# Patient Record
Sex: MALE | Race: WHITE | NOT HISPANIC OR LATINO | Employment: FULL TIME | URBAN - METROPOLITAN AREA
[De-identification: names, ages, dates, MRNs, and addresses within clinical notes are randomized per-mention and may not be internally consistent; named-entity substitution may affect disease eponyms.]

---

## 2020-03-07 ENCOUNTER — HOSPITAL ENCOUNTER (EMERGENCY)
Facility: HOSPITAL | Age: 25
Discharge: HOME | End: 2020-03-08
Attending: EMERGENCY MEDICINE | Admitting: EMERGENCY MEDICINE

## 2020-03-07 DIAGNOSIS — J02.9 ACUTE VIRAL PHARYNGITIS: Primary | ICD-10-CM

## 2020-03-07 PROCEDURE — 87880 STREP A ASSAY W/OPTIC: CPT | Performed by: EMERGENCY MEDICINE

## 2020-03-07 PROCEDURE — 87147 CULTURE TYPE IMMUNOLOGIC: CPT | Performed by: EMERGENCY MEDICINE

## 2020-03-07 PROCEDURE — 87081 CULTURE SCREEN ONLY: CPT | Performed by: EMERGENCY MEDICINE

## 2020-03-07 PROCEDURE — 99284 EMERGENCY DEPT VISIT MOD MDM: CPT

## 2020-03-08 VITALS
HEART RATE: 77 BPM | TEMPERATURE: 98.9 F | DIASTOLIC BLOOD PRESSURE: 65 MMHG | RESPIRATION RATE: 16 BRPM | HEIGHT: 70 IN | WEIGHT: 275 LBS | BODY MASS INDEX: 39.37 KG/M2 | OXYGEN SATURATION: 97 % | SYSTOLIC BLOOD PRESSURE: 119 MMHG

## 2020-03-08 LAB — S PYO AG THROAT QL: NEGATIVE

## 2020-03-08 PROCEDURE — 25010000002 DEXAMETHASONE PER 1 MG

## 2020-03-08 RX ORDER — DICLOFENAC SODIUM 75 MG/1
75 TABLET, DELAYED RELEASE ORAL 2 TIMES DAILY
Qty: 14 TABLET | Refills: 0 | Status: SHIPPED | OUTPATIENT
Start: 2020-03-08

## 2020-03-08 RX ORDER — DEXAMETHASONE SODIUM PHOSPHATE 10 MG/ML
10 INJECTION INTRAMUSCULAR; INTRAVENOUS ONCE
Status: COMPLETED | OUTPATIENT
Start: 2020-03-08 | End: 2020-03-08

## 2020-03-08 RX ADMIN — DEXAMETHASONE SODIUM PHOSPHATE 10 MG: 10 INJECTION INTRAMUSCULAR; INTRAVENOUS at 00:35

## 2020-03-08 NOTE — ED PROVIDER NOTES
Subjective   Mr. Chai Borrero is a 25 y.o male presenting to the emergency department with complaints of sore throat. His symptoms began yesterday and he complains of sore throat, fatigue, intermittent headache, nausea, and body aches primarily in his neck and shoulders. He had an episode of vomiting last night and confirms diarrhea today. He denies cough and fever. There are no other acute symptoms at this time.      History provided by:  Patient  Sore Throat   Location:  Generalized  Severity:  Moderate  Onset quality:  Sudden  Duration:  2 days  Timing:  Constant  Progression:  Worsening  Chronicity:  New  Relieved by:  None tried  Worsened by:  Nothing  Ineffective treatments:  None tried  Associated symptoms: headaches    Associated symptoms: no cough and no fever        Review of Systems   Constitutional: Positive for fatigue. Negative for fever.        Body aches   HENT: Positive for sore throat.    Respiratory: Negative for cough.    Gastrointestinal: Positive for diarrhea, nausea and vomiting.   Neurological: Positive for headaches.   All other systems reviewed and are negative.      History reviewed. No pertinent past medical history.    No Known Allergies    History reviewed. No pertinent surgical history.    History reviewed. No pertinent family history.    Social History     Socioeconomic History   • Marital status:      Spouse name: Not on file   • Number of children: Not on file   • Years of education: Not on file   • Highest education level: Not on file         Objective   Physical Exam   Constitutional: He is oriented to person, place, and time. He appears well-developed and well-nourished. No distress.   HENT:   Head: Normocephalic and atraumatic.   Mouth/Throat: No tonsillar exudate.   Bilateral tonsillar enlargement. No erythema. No exudates.   Eyes: Conjunctivae are normal. No scleral icterus.   Neck: Normal range of motion. Neck supple.   Anterior cervical lymph adenopathy    Cardiovascular: Normal rate, regular rhythm and normal heart sounds. Exam reveals no gallop and no friction rub.   No murmur heard.  Pulmonary/Chest: Effort normal and breath sounds normal. No respiratory distress. He has no wheezes.   Abdominal: Soft. There is no tenderness. There is no rebound and no guarding.   Musculoskeletal: He exhibits no deformity.   Lymphadenopathy:     He has cervical adenopathy.   Neurological: He is alert and oriented to person, place, and time.   Skin: Skin is warm and dry. He is not diaphoretic.   Psychiatric: He has a normal mood and affect. His behavior is normal.   Nursing note and vitals reviewed.      Procedures         ED Course          RSS negative.  Decadron given.  Patient stable on serial rechecks.  Discussed findings, concerns, plan of care, expected course, reasons to return and followup.  Provided the opportunity to ask questions.                                    MDM    Final diagnoses:   Acute viral pharyngitis       Documentation assistance provided by jamie Vidal.  Information recorded by the jamie was done at my direction and has been verified and validated by me.     Sharonda Vidal  03/07/20 2007       Iraj Ortiz MD  03/08/20 0027

## 2020-03-10 LAB — BACTERIA SPEC AEROBE CULT: ABNORMAL

## 2020-03-11 ENCOUNTER — TELEPHONE (OUTPATIENT)
Dept: EMERGENCY DEPT | Facility: HOSPITAL | Age: 25
End: 2020-03-11

## 2020-06-01 ENCOUNTER — APPOINTMENT (OUTPATIENT)
Dept: GENERAL RADIOLOGY | Facility: HOSPITAL | Age: 25
End: 2020-06-01

## 2020-06-01 ENCOUNTER — HOSPITAL ENCOUNTER (EMERGENCY)
Facility: HOSPITAL | Age: 25
Discharge: HOME | End: 2020-06-01
Attending: EMERGENCY MEDICINE | Admitting: EMERGENCY MEDICINE

## 2020-06-01 VITALS
BODY MASS INDEX: 40.04 KG/M2 | HEIGHT: 71 IN | TEMPERATURE: 97.9 F | RESPIRATION RATE: 16 BRPM | WEIGHT: 286 LBS | HEART RATE: 88 BPM | DIASTOLIC BLOOD PRESSURE: 81 MMHG | OXYGEN SATURATION: 98 % | SYSTOLIC BLOOD PRESSURE: 134 MMHG

## 2020-06-01 DIAGNOSIS — S91.331A PUNCTURE WOUND OF RIGHT FOOT, INITIAL ENCOUNTER: Primary | ICD-10-CM

## 2020-06-01 PROCEDURE — 25010000002 TDAP 5-2.5-18.5 LF-MCG/0.5 SUSPENSION: Performed by: PHYSICIAN ASSISTANT

## 2020-06-01 PROCEDURE — 90715 TDAP VACCINE 7 YRS/> IM: CPT | Performed by: PHYSICIAN ASSISTANT

## 2020-06-01 PROCEDURE — 99282 EMERGENCY DEPT VISIT SF MDM: CPT

## 2020-06-01 PROCEDURE — 73630 X-RAY EXAM OF FOOT: CPT

## 2020-06-01 PROCEDURE — 90471 IMMUNIZATION ADMIN: CPT | Performed by: PHYSICIAN ASSISTANT

## 2020-06-01 RX ORDER — CIPROFLOXACIN 500 MG/1
500 TABLET, FILM COATED ORAL 2 TIMES DAILY
Qty: 10 TABLET | Refills: 0 | Status: SHIPPED | OUTPATIENT
Start: 2020-06-01 | End: 2020-06-06

## 2020-06-01 RX ADMIN — TETANUS TOXOID, REDUCED DIPHTHERIA TOXOID AND ACELLULAR PERTUSSIS VACCINE, ADSORBED 0.5 ML: 5; 2.5; 8; 8; 2.5 SUSPENSION INTRAMUSCULAR at 12:54

## 2020-06-01 NOTE — ED PROVIDER NOTES
Subjective   25-year-old male presents with an injury to the bottom of his right foot, he stepped on a nail yesterday, through his flip-flops, the nail went into his foot.  He pulled the nail out and threw it away, but is now having pain and redness in the area.      History provided by:  Patient      Review of Systems   Skin:        Puncture wound to the bottom of right foot   All other systems reviewed and are negative.      History reviewed. No pertinent past medical history.    No Known Allergies    History reviewed. No pertinent surgical history.    History reviewed. No pertinent family history.    Social History     Socioeconomic History   • Marital status:      Spouse name: Not on file   • Number of children: Not on file   • Years of education: Not on file   • Highest education level: Not on file   Tobacco Use   • Smoking status: Never Smoker   Substance and Sexual Activity   • Alcohol use: Yes     Comment: Social   • Drug use: Not Currently           Objective   Physical Exam   Constitutional: He is oriented to person, place, and time. He appears well-developed and well-nourished.   HENT:   Head: Normocephalic and atraumatic.   Eyes: EOM are normal.   Neck: Normal range of motion.   Pulmonary/Chest: Effort normal.   Musculoskeletal: Normal range of motion.   Puncture wound plantar surface of right foot        Neurological: He is alert and oriented to person, place, and time.   Skin: Skin is warm and dry.   Psychiatric: He has a normal mood and affect. His behavior is normal.   Nursing note and vitals reviewed.      Procedures           ED Course                                           MDM  Number of Diagnoses or Management Options  Puncture wound of right foot, initial encounter:      Amount and/or Complexity of Data Reviewed  Tests in the radiology section of CPT®: reviewed    Risk of Complications, Morbidity, and/or Mortality  Presenting problems: minimal  Diagnostic procedures:  minimal  Management options: minimal    Patient Progress  Patient progress: stable      Final diagnoses:   Puncture wound of right foot, initial encounter            Ken Mensah Jr., PA-C  06/01/20 [id number]

## 2020-07-05 ENCOUNTER — HOSPITAL ENCOUNTER (EMERGENCY)
Facility: HOSPITAL | Age: 25
Discharge: HOME | End: 2020-07-05
Attending: EMERGENCY MEDICINE | Admitting: EMERGENCY MEDICINE

## 2020-07-05 VITALS
DIASTOLIC BLOOD PRESSURE: 90 MMHG | TEMPERATURE: 98.7 F | OXYGEN SATURATION: 98 % | SYSTOLIC BLOOD PRESSURE: 148 MMHG | RESPIRATION RATE: 18 BRPM | BODY MASS INDEX: 42.18 KG/M2 | WEIGHT: 294.6 LBS | HEIGHT: 70 IN | HEART RATE: 89 BPM

## 2020-07-05 DIAGNOSIS — L24.7 IRRITANT CONTACT DERMATITIS DUE TO PLANTS, EXCEPT FOOD: Primary | ICD-10-CM

## 2020-07-05 PROCEDURE — 99282 EMERGENCY DEPT VISIT SF MDM: CPT

## 2020-07-05 PROCEDURE — 25010000002 METHYLPREDNISOLONE PER 125 MG: Performed by: PHYSICIAN ASSISTANT

## 2020-07-05 PROCEDURE — 96372 THER/PROPH/DIAG INJ SC/IM: CPT

## 2020-07-05 RX ORDER — METHYLPREDNISOLONE SODIUM SUCCINATE 125 MG/2ML
80 INJECTION, POWDER, LYOPHILIZED, FOR SOLUTION INTRAMUSCULAR; INTRAVENOUS ONCE
Status: COMPLETED | OUTPATIENT
Start: 2020-07-05 | End: 2020-07-05

## 2020-07-05 RX ORDER — PREDNISONE 10 MG/1
TABLET ORAL
Qty: 39 TABLET | Refills: 0 | Status: SHIPPED | OUTPATIENT
Start: 2020-07-05 | End: 2020-07-17

## 2020-07-05 RX ADMIN — METHYLPREDNISOLONE SODIUM SUCCINATE 80 MG: 125 INJECTION, POWDER, FOR SOLUTION INTRAMUSCULAR; INTRAVENOUS at 18:22

## 2020-07-05 NOTE — ED PROVIDER NOTES
Subjective   This patient states Thursday he was out cutting some bushes and since then he has developed a rash to his bilateral upper extremities and one very mild area to his left anterior thigh.  He states he is incredibly pruritic and his left elbow began weeping some serous fluid.  No facial involvement.  He states he has had contact dermatitis in the past and this feels similar.          Review of Systems   Constitutional: Negative.    HENT: Negative.    Eyes: Negative.    Respiratory: Negative.    Cardiovascular: Negative.    Gastrointestinal: Negative.    Genitourinary: Negative.    Musculoskeletal: Negative.    Skin:        Rash to bilateral extremities and left anterior thigh   Allergic/Immunologic: Negative.    Neurological: Negative.    Psychiatric/Behavioral: Negative.    All other systems reviewed and are negative.      History reviewed. No pertinent past medical history.    No Known Allergies    History reviewed. No pertinent surgical history.    History reviewed. No pertinent family history.    Social History     Socioeconomic History   • Marital status:      Spouse name: Not on file   • Number of children: Not on file   • Years of education: Not on file   • Highest education level: Not on file   Tobacco Use   • Smoking status: Never Smoker   Substance and Sexual Activity   • Alcohol use: Yes     Comment: Social   • Drug use: Not Currently           Objective   Physical Exam   Constitutional: He appears well-developed and well-nourished.   HENT:   Head: Normocephalic and atraumatic.   Eyes: EOM are normal.   Cardiovascular: Normal rate.   Pulmonary/Chest: Effort normal.   Musculoskeletal: Normal range of motion.   Neurological: He is alert.   Skin:   Rash to the bilateral upper extremities consistent with contact dermatitis, linear nature mildly vesicular and serous weeping fluid from a confluent area to the left elbow.  No evidence of cellulitis.   Psychiatric: He has a normal mood and  affect. His behavior is normal. Thought content normal.   Nursing note and vitals reviewed.      Procedures           ED Course                                           MDM    Final diagnoses:   Irritant contact dermatitis due to plants, except food            Yusuf Guillaume PA-C  07/05/20 7858

## 2020-11-20 ENCOUNTER — HOSPITAL ENCOUNTER (EMERGENCY)
Facility: HOSPITAL | Age: 25
Discharge: HOME | End: 2020-11-20
Attending: EMERGENCY MEDICINE | Admitting: EMERGENCY MEDICINE

## 2020-11-20 VITALS
RESPIRATION RATE: 18 BRPM | DIASTOLIC BLOOD PRESSURE: 82 MMHG | HEIGHT: 70 IN | TEMPERATURE: 98.3 F | HEART RATE: 72 BPM | SYSTOLIC BLOOD PRESSURE: 137 MMHG | BODY MASS INDEX: 43.35 KG/M2 | OXYGEN SATURATION: 98 % | WEIGHT: 302.8 LBS

## 2020-11-20 DIAGNOSIS — R11.10 VOMITING AND DIARRHEA: Primary | ICD-10-CM

## 2020-11-20 DIAGNOSIS — R19.7 VOMITING AND DIARRHEA: Primary | ICD-10-CM

## 2020-11-20 PROCEDURE — 99282 EMERGENCY DEPT VISIT SF MDM: CPT

## 2020-11-20 RX ORDER — DICYCLOMINE HCL 20 MG
20 TABLET ORAL EVERY 6 HOURS
Qty: 12 TABLET | Refills: 0 | Status: SHIPPED | OUTPATIENT
Start: 2020-11-20 | End: 2020-11-23

## 2020-11-20 RX ORDER — ONDANSETRON 4 MG/1
4 TABLET, ORALLY DISINTEGRATING ORAL EVERY 6 HOURS PRN
Qty: 8 TABLET | Refills: 0 | Status: SHIPPED | OUTPATIENT
Start: 2020-11-20 | End: 2020-11-22

## 2024-08-21 ENCOUNTER — OFFICE VISIT (OUTPATIENT)
Dept: FAMILY MEDICINE CLINIC | Facility: CLINIC | Age: 29
End: 2024-08-21
Payer: MEDICAID

## 2024-08-21 VITALS
BODY MASS INDEX: 41.63 KG/M2 | TEMPERATURE: 97.7 F | SYSTOLIC BLOOD PRESSURE: 138 MMHG | RESPIRATION RATE: 16 BRPM | HEART RATE: 78 BPM | OXYGEN SATURATION: 98 % | DIASTOLIC BLOOD PRESSURE: 88 MMHG | WEIGHT: 290.8 LBS | HEIGHT: 70 IN

## 2024-08-21 DIAGNOSIS — R63.5 UNINTENDED WEIGHT GAIN: ICD-10-CM

## 2024-08-21 DIAGNOSIS — M72.2 PLANTAR FASCIITIS, BILATERAL: ICD-10-CM

## 2024-08-21 DIAGNOSIS — Z30.09 FAMILY PLANNING SERVICES: ICD-10-CM

## 2024-08-21 DIAGNOSIS — R19.7 DIARRHEA, UNSPECIFIED TYPE: ICD-10-CM

## 2024-08-21 DIAGNOSIS — Z11.59 NEED FOR HEPATITIS C SCREENING TEST: ICD-10-CM

## 2024-08-21 DIAGNOSIS — J06.9 ACUTE URI: ICD-10-CM

## 2024-08-21 DIAGNOSIS — E66.01 CLASS 3 SEVERE OBESITY DUE TO EXCESS CALORIES WITH BODY MASS INDEX (BMI) OF 40.0 TO 44.9 IN ADULT, UNSPECIFIED WHETHER SERIOUS COMORBIDITY PRESENT: ICD-10-CM

## 2024-08-21 DIAGNOSIS — Z72.51 HIGH RISK HETEROSEXUAL BEHAVIOR: Primary | ICD-10-CM

## 2024-08-21 DIAGNOSIS — R30.0 DYSURIA: ICD-10-CM

## 2024-08-21 DIAGNOSIS — R73.9 HYPERGLYCEMIA: ICD-10-CM

## 2024-08-21 LAB
BILIRUB BLD-MCNC: NEGATIVE MG/DL
CLARITY, POC: CLEAR
COLOR UR: YELLOW
EXPIRATION DATE: ABNORMAL
GLUCOSE UR STRIP-MCNC: NEGATIVE MG/DL
KETONES UR QL: NEGATIVE
LEUKOCYTE EST, POC: ABNORMAL
Lab: ABNORMAL
NITRITE UR-MCNC: NEGATIVE MG/ML
PH UR: 6 [PH] (ref 5–8)
PROT UR STRIP-MCNC: NEGATIVE MG/DL
RBC # UR STRIP: NEGATIVE /UL
SP GR UR: 1.03 (ref 1–1.03)
UROBILINOGEN UR QL: ABNORMAL

## 2024-08-21 RX ORDER — LOPERAMIDE HYDROCHLORIDE 2 MG/1
2 TABLET ORAL 4 TIMES DAILY PRN
Qty: 30 TABLET | Refills: 1 | Status: SHIPPED | OUTPATIENT
Start: 2024-08-21

## 2024-08-21 RX ORDER — TRAZODONE HYDROCHLORIDE 50 MG/1
50 TABLET ORAL NIGHTLY
COMMUNITY
Start: 2024-08-21

## 2024-08-21 RX ORDER — SERTRALINE HYDROCHLORIDE 100 MG/1
200 TABLET, FILM COATED ORAL DAILY
COMMUNITY

## 2024-08-21 RX ORDER — BROMPHENIRAMINE MALEATE, PSEUDOEPHEDRINE HYDROCHLORIDE, AND DEXTROMETHORPHAN HYDROBROMIDE 2; 30; 10 MG/5ML; MG/5ML; MG/5ML
5 SYRUP ORAL 4 TIMES DAILY PRN
Qty: 118 ML | Refills: 0 | Status: SHIPPED | OUTPATIENT
Start: 2024-08-21

## 2024-08-21 NOTE — PROGRESS NOTES
"    Office Note     Name: Chai Borrero  : 1995   MRN: [id number]     Chief Complaint:  Establish Care (GI issues )    Subjective     History of Present Illness:  Chai Borrero is a 29 y.o. male who presents today for stable she can care and for \"a list of things\".  Patient reports that \"he recently got insurance and wanted to get a lot of things checked out\".  His main concern today is diarrhea.  He reports that he has diarrhea often approximately 2-3 times a day.  Diet is poor and patient is not drinking a lot of water or eating fruits or veggies.  No blood or mucus in the stool.  No abdominal pain with diarrhea.  He has not tried anything to help with diarrhea.    Patient has  itching and dysuria.  Reports that this has been going on for several weeks.  Reports that he has had 7-8 partners in the last year.  He reports that most of these were protected but he did have unprotected intercourse.  Describes the pain as burning with urination.  No bleeding or discharge.  No testicular pain.  No rashes noted.    He describes weight gain of approximately 20 pounds over the last year.  Not exercising and poor diet.    Patient reports recent URI with cough.  Cough has persisted.  Not taking any allergy meds at this time.    I have reviewed the following portions of the patient's history and these were updated and discussed with the patient as appropriate: past family history, past medical history, past social history, past surgical history, and problem list.     Objective     Vital Signs  /88   Pulse 78   Temp 97.7 °F (36.5 °C) (Temporal)   Resp 16   Ht 177.8 cm (70\")   Wt 132 kg (290 lb 12.8 oz)   SpO2 98%   BMI 41.73 kg/m²   Estimated body mass index is 41.73 kg/m² as calculated from the following:    Height as of this encounter: 177.8 cm (70\").    Weight as of this encounter: 132 kg (290 lb 12.8 oz).    Physical Exam  Vitals reviewed.   Constitutional:       General: He is not in acute distress.    "  Appearance: Normal appearance. He is morbidly obese. He is not ill-appearing.   HENT:      Head: Normocephalic.   Eyes:      Extraocular Movements: Extraocular movements intact.   Cardiovascular:      Rate and Rhythm: Normal rate and regular rhythm.      Heart sounds: Normal heart sounds. No murmur heard.  Pulmonary:      Effort: Pulmonary effort is normal. No respiratory distress.      Breath sounds: Normal breath sounds. No stridor. No wheezing, rhonchi or rales.   Chest:      Chest wall: No tenderness.   Abdominal:      General: There is no distension.      Palpations: Abdomen is soft. There is no mass.      Tenderness: There is no abdominal tenderness. There is no guarding or rebound.      Hernia: No hernia is present.   Musculoskeletal:         General: Normal range of motion.      Cervical back: Normal range of motion.   Skin:     General: Skin is warm and dry.   Neurological:      Mental Status: He is alert and oriented to person, place, and time.   Psychiatric:         Mood and Affect: Mood normal.         Behavior: Behavior normal.            Assessment and Plan     Diagnoses and all orders for this visit:    1. High risk heterosexual behavior (Primary)  -     Urine Culture - Urine, Urine, Clean Catch  -     POCT urinalysis dipstick, automated  -     Chlamydia trachomatis, Neisseria gonorrhoeae, PCR - Urine, Urine, Clean Catch  -     HIV-1 / O / 2 Ag / Antibody  -     HSV 1 & 2 - Specific Antibody, IgG  -     RPR Qualitative with Reflex to Quant    2. Dysuria  -     Urine Culture - Urine, Urine, Clean Catch  -     POCT urinalysis dipstick, automated  -     Chlamydia trachomatis, Neisseria gonorrhoeae, PCR - Urine, Urine, Clean Catch    3. Unintended weight gain  -     CBC (No Diff)  -     Comprehensive Metabolic Panel  -     TSH Rfx On Abnormal To Free T4    4. Hyperglycemia  -     CBC (No Diff)  -     Lipid Panel  -     Hemoglobin A1c    5. Need for hepatitis C screening test  -     Hepatitis C  Antibody    6. Class 3 severe obesity due to excess calories with body mass index (BMI) of 40.0 to 44.9 in adult, unspecified whether serious comorbidity present  -     CBC (No Diff)  -     Comprehensive Metabolic Panel    7. Acute URI  -     brompheniramine-pseudoephedrine-DM 30-2-10 MG/5ML syrup; Take 5 mL by mouth 4 (Four) Times a Day As Needed for Allergies.  Dispense: 118 mL; Refill: 0    8. Diarrhea, unspecified type  -     loperamide (Imodium A-D) 2 MG tablet; Take 1 tablet by mouth 4 (Four) Times a Day As Needed for Diarrhea.  Dispense: 30 tablet; Refill: 1    9. Plantar fasciitis, bilateral    10. Family planning services  -     Ambulatory Referral to Urology    Diarrhea likely secondary to poor diet.  Discussed dietary options that may help improve diarrhea.  Patient may have possible IBS versus IBD.  Will start short course of loperamide.  If this does not help may consider GI referral for workup.  Persistent cough after acute URI.  Will prescribe short course of Bromfed cough syrup  Long discussion regarding diet and exercise to help with obesity.  Patient requested possible medication but his plan does not cover obesity medication.  Will check labs as per above  Patient has significant high risk heterosexual behavior.  POC UA today shows increased LE.  Urine sent for culture, CT/NG PCR, will also check for HIV, HSV, and syphilis.  Pending results patient may need to inform partners, patient has had 7-8 over the last year  Patient complains of heel pain that occurs when he walks.  He has not replaced issues in a long time and does not have any inserts.  Likely secondary to plantar fasciitis.  Discussed stretching and conservative measures that would help with pain.  Further instructions printed with AVS.  Patient would like vasectomy.  Referral to urology    Class 3 Severe Obesity (BMI >=40). Obesity-related health conditions include the following: none. Obesity is newly identified. BMI is is above  average; BMI management plan is completed. We discussed low calorie, low carb based diet program, portion control, increasing exercise, joining a fitness center or start home based exercise program, Weight Watchers or other Commercial based weight reduction program, management of depression/anxiety/stress to control compensatory eating, decreasing alcohol consumption, and an duran-based approach such as [employer] Pal or Lose It.         Discussion Summary:     Discussed plan of care in detail with patient today. Patient was encouraged to keep me informed of any acute changes, lack of improvement, or any new concerning symptoms.  Patient is also aware of reasons to seek emergent care. Patient verbalized understanding and agrees with plan of care.    This visit was billed based on time.  I spent 60 minutes caring for Chai Borrero on this date of service. This time includes time spent by me in the following activities:preparing for the visit, reviewing tests, obtaining and/or reviewing a separately obtained history, performing a medically appropriate examination and/or evaluation , counseling and educating the patient/family/caregiver, ordering medications, tests, or procedures, referring and communicating with other health care professionals , documenting information in the medical record, independently interpreting results and communicating that information with the patient/family/caregiver, and care coordination.    Follow Up  Return in about 2 weeks (around 9/4/2024) for Annual physical.    Please note that portions of this note may have been completed with a voice recognition program.     Praeven Garay MD, MPH  [facility] [place] [facility]

## 2024-08-22 PROBLEM — E66.01 CLASS 3 SEVERE OBESITY DUE TO EXCESS CALORIES WITH BODY MASS INDEX (BMI) OF 40.0 TO 44.9 IN ADULT: Status: ACTIVE | Noted: 2024-08-22

## 2024-08-22 PROBLEM — M72.2 PLANTAR FASCIITIS, BILATERAL: Status: ACTIVE | Noted: 2024-08-22

## 2024-08-22 PROBLEM — E66.813 CLASS 3 SEVERE OBESITY DUE TO EXCESS CALORIES WITH BODY MASS INDEX (BMI) OF 40.0 TO 44.9 IN ADULT: Status: ACTIVE | Noted: 2024-08-22

## 2024-08-23 ENCOUNTER — TELEPHONE (OUTPATIENT)
Dept: FAMILY MEDICINE CLINIC | Facility: CLINIC | Age: 29
End: 2024-08-23
Payer: MEDICAID

## 2024-08-24 LAB
ALBUMIN SERPL-MCNC: 4 G/DL (ref 3.5–5.2)
ALBUMIN/GLOB SERPL: 1.5 G/DL
ALP SERPL-CCNC: 55 U/L (ref 39–117)
ALT SERPL-CCNC: 52 U/L (ref 1–41)
AST SERPL-CCNC: 28 U/L (ref 1–40)
BACTERIA UR CULT: NO GROWTH
BACTERIA UR CULT: NORMAL
BILIRUB SERPL-MCNC: 0.4 MG/DL (ref 0–1.2)
BUN SERPL-MCNC: 8 MG/DL (ref 6–20)
BUN/CREAT SERPL: 9.1 (ref 7–25)
C TRACH RRNA SPEC QL NAA+PROBE: NEGATIVE
CALCIUM SERPL-MCNC: 9.3 MG/DL (ref 8.6–10.5)
CHLORIDE SERPL-SCNC: 100 MMOL/L (ref 98–107)
CHOLEST SERPL-MCNC: 170 MG/DL (ref 0–200)
CO2 SERPL-SCNC: 25.9 MMOL/L (ref 22–29)
CREAT SERPL-MCNC: 0.88 MG/DL (ref 0.76–1.27)
EGFRCR SERPLBLD CKD-EPI 2021: 119.4 ML/MIN/1.73
ERYTHROCYTE [DISTWIDTH] IN BLOOD BY AUTOMATED COUNT: 12.7 % (ref 12.3–15.4)
GLOBULIN SER CALC-MCNC: 2.6 GM/DL
GLUCOSE SERPL-MCNC: 99 MG/DL (ref 65–99)
HBA1C MFR BLD: 6 % (ref 4.8–5.6)
HCT VFR BLD AUTO: 45.9 % (ref 37.5–51)
HCV IGG SERPL QL IA: NON REACTIVE
HDLC SERPL-MCNC: 32 MG/DL (ref 40–60)
HGB BLD-MCNC: 15.3 G/DL (ref 13–17.7)
HIV 1+2 AB+HIV1 P24 AG SERPL QL IA: NON REACTIVE
HSV1 IGG SER IA-ACNC: 1.76 INDEX (ref 0–0.9)
HSV2 IGG SER IA-ACNC: <0.91 INDEX (ref 0–0.9)
LDLC SERPL CALC-MCNC: 104 MG/DL (ref 0–100)
MCH RBC QN AUTO: 30.1 PG (ref 26.6–33)
MCHC RBC AUTO-ENTMCNC: 33.3 G/DL (ref 31.5–35.7)
MCV RBC AUTO: 90.2 FL (ref 79–97)
N GONORRHOEA RRNA SPEC QL NAA+PROBE: NEGATIVE
PLATELET # BLD AUTO: 309 10*3/MM3 (ref 140–450)
POTASSIUM SERPL-SCNC: 4.1 MMOL/L (ref 3.5–5.2)
PROT SERPL-MCNC: 6.6 G/DL (ref 6–8.5)
RBC # BLD AUTO: 5.09 10*6/MM3 (ref 4.14–5.8)
RPR SER QL: NON REACTIVE
SODIUM SERPL-SCNC: 137 MMOL/L (ref 136–145)
TRIGL SERPL-MCNC: 193 MG/DL (ref 0–150)
TSH SERPL DL<=0.005 MIU/L-ACNC: 3.14 UIU/ML (ref 0.27–4.2)
VLDLC SERPL CALC-MCNC: 34 MG/DL (ref 5–40)
WBC # BLD AUTO: 9.02 10*3/MM3 (ref 3.4–10.8)

## 2024-08-29 NOTE — TELEPHONE ENCOUNTER
ATTEMPTED TO CONTACT PATIENT.    THERE WAS NO ANSWER, LEFT MESSAGE FOR PATIENT.    OK PER [employer]BAL CLIFF.

## 2024-09-05 ENCOUNTER — OFFICE VISIT (OUTPATIENT)
Dept: FAMILY MEDICINE CLINIC | Facility: CLINIC | Age: 29
End: 2024-09-05
Payer: MEDICAID

## 2024-09-05 VITALS
HEIGHT: 70 IN | HEART RATE: 89 BPM | SYSTOLIC BLOOD PRESSURE: 138 MMHG | RESPIRATION RATE: 16 BRPM | DIASTOLIC BLOOD PRESSURE: 82 MMHG | BODY MASS INDEX: 41.8 KG/M2 | TEMPERATURE: 97.9 F | OXYGEN SATURATION: 96 % | WEIGHT: 292 LBS

## 2024-09-05 DIAGNOSIS — Z00.00 WELL ADULT EXAM: Primary | ICD-10-CM

## 2024-09-05 DIAGNOSIS — Z86.19 HISTORY OF PCR DNA POSITIVE FOR HSV1: ICD-10-CM

## 2024-09-05 DIAGNOSIS — R73.03 PREDIABETES: ICD-10-CM

## 2024-09-05 DIAGNOSIS — E78.5 DYSLIPIDEMIA: ICD-10-CM

## 2024-09-05 DIAGNOSIS — E78.1 HYPERTRIGLYCERIDEMIA: ICD-10-CM

## 2024-09-05 DIAGNOSIS — F32.9 MAJOR DEPRESSIVE DISORDER, REMISSION STATUS UNSPECIFIED, UNSPECIFIED WHETHER RECURRENT: ICD-10-CM

## 2024-09-05 DIAGNOSIS — E66.01 CLASS 3 SEVERE OBESITY DUE TO EXCESS CALORIES WITH BODY MASS INDEX (BMI) OF 40.0 TO 44.9 IN ADULT, UNSPECIFIED WHETHER SERIOUS COMORBIDITY PRESENT: ICD-10-CM

## 2024-09-05 PROCEDURE — 1159F MED LIST DOCD IN RCRD: CPT | Performed by: FAMILY MEDICINE

## 2024-09-05 PROCEDURE — 2014F MENTAL STATUS ASSESS: CPT | Performed by: FAMILY MEDICINE

## 2024-09-05 PROCEDURE — 99395 PREV VISIT EST AGE 18-39: CPT | Performed by: FAMILY MEDICINE

## 2024-09-05 PROCEDURE — 1160F RVW MEDS BY RX/DR IN RCRD: CPT | Performed by: FAMILY MEDICINE

## 2024-09-05 RX ORDER — SERTRALINE HYDROCHLORIDE 100 MG/1
200 TABLET, FILM COATED ORAL DAILY
Qty: 180 TABLET | Refills: 3 | Status: SHIPPED | OUTPATIENT
Start: 2024-09-05

## 2024-09-05 NOTE — PATIENT INSTRUCTIONS
[facility]  [address] [id number] [phone]    Call to schedule an appointment. Referral already placed.

## 2024-09-05 NOTE — PROGRESS NOTES
Male Physical Note      Date: 2024   Patient Name: Chai Borrero  : 1995   MRN: [id number]     Chief Complaint:    Chief Complaint   Patient presents with    Annual Exam     Pt is here for physical. Follow up on labs and medications          History of Present Illness: Chai Borrero is a 29 y.o. male who is here today for their annual health maintenance and physical.  Patient reports that diarrhea is improving with use of Imodium.  He does have questions about lab work done at last visit.  Patient currently on sertraline 200 mg daily.  This was prescribed by psychiatrist but patient has had a hard time scheduling follow-ups with psychiatrist and would like to know if I can prescribe this today.  Depression and anxiety have been controlled on current medication and he denies side effects.      Subjective      I have reviewed the following portions of the patient's history and these were updated and discussed with the patient as appropriate: past family history, past medical history, past social history, past surgical history, and problem list.      Medications:     Current Outpatient Medications:     loperamide (Imodium A-D) 2 MG tablet, Take 1 tablet by mouth 4 (Four) Times a Day As Needed for Diarrhea., Disp: 30 tablet, Rfl: 1    sertraline (ZOLOFT) 100 MG tablet, Take 2 tablets by mouth Daily., Disp: 180 tablet, Rfl: 3    traZODone (DESYREL) 50 MG tablet, Take 1 tablet by mouth Every Night., Disp: , Rfl:     Allergies:   No Known Allergies    Immunizations:  Immunization History   Administered Date(s) Administered    DTP / HiB 1998    DTaP, Unspecified 1999    Hep B, Unspecified 1996    IPV 1999    MMR 1998, 1999    Tdap 2020        Diet/Physical activity: Poor per patient.  Lots of carbohydrates.  Minimal exercise.      Objective     Physical Exam:  Vital Signs:   Vitals:    24 1009   BP: 138/82   Pulse: 89   Resp: 16   Temp: 97.9 °F (36.6 °C)  "  SpO2: 96%   Weight: 132 kg (292 lb)   Height: 177.8 cm (70\")     Body mass index is 41.9 kg/m².     Physical Exam  Vitals reviewed.   Constitutional:       General: He is not in acute distress.     Appearance: Normal appearance. He is morbidly obese. He is not ill-appearing.   HENT:      Head: Normocephalic and atraumatic.      Right Ear: Tympanic membrane, ear canal and external ear normal.      Left Ear: Tympanic membrane, ear canal and external ear normal.      Nose: Nose normal. No congestion or rhinorrhea.      Mouth/Throat:      Mouth: Mucous membranes are moist.      Pharynx: No oropharyngeal exudate or posterior oropharyngeal erythema.   Eyes:      General: No scleral icterus.        Right eye: No discharge.         Left eye: No discharge.      Extraocular Movements: Extraocular movements intact.      Conjunctiva/sclera: Conjunctivae normal.   Cardiovascular:      Rate and Rhythm: Normal rate and regular rhythm.      Heart sounds: Normal heart sounds. No murmur heard.  Pulmonary:      Effort: Pulmonary effort is normal. No respiratory distress.      Breath sounds: Normal breath sounds. No stridor. No wheezing or rales.   Abdominal:      General: Bowel sounds are normal. There is no distension.      Palpations: Abdomen is soft. There is no mass.      Tenderness: There is no abdominal tenderness. There is no guarding or rebound.      Hernia: No hernia is present.   Musculoskeletal:         General: Normal range of motion.      Cervical back: Normal range of motion.      Right lower leg: No edema.      Left lower leg: No edema.   Skin:     General: Skin is warm and dry.   Neurological:      Mental Status: He is alert and oriented to person, place, and time. Mental status is at baseline.   Psychiatric:         Mood and Affect: Mood normal.         Behavior: Behavior normal.         Thought Content: Thought content normal.         Judgment: Judgment normal.         Procedures    Assessment / Plan  "     Assessment/Plan:   Diagnoses and all orders for this visit:    1. Well adult exam (Primary)    2. Class 3 severe obesity due to excess calories with body mass index (BMI) of 40.0 to 44.9 in adult, unspecified whether serious comorbidity present    3. Dyslipidemia    4. Hypertriglyceridemia    5. Prediabetes    6. History of PCR DNA positive for HSV1    7. Major depressive disorder, remission status unspecified, unspecified whether recurrent  -     sertraline (ZOLOFT) 100 MG tablet; Take 2 tablets by mouth Daily.  Dispense: 180 tablet; Refill: 3    Currently, he does not smoke or use tobacco    Reviewed care gaps and recommended screening tests with patient.  Preventative screenings up-to-date  Immunizations reviewed. Up to date with current vaccines.  Reviewed chronic conditions.  Depression well-controlled on current medications and patient having no side effects.  Will refill sertraline 200 mg daily.  90-day supply e-prescribed to pharmacy  Class 3 Severe Obesity (BMI >=40). Obesity-related health conditions include the following: dyslipidemias. Obesity is worsening. BMI is is above average; BMI management plan is completed. We discussed low calorie, low carb based diet program, portion control, increasing exercise, joining a fitness center or start home based exercise program, Weight Watchers or other Commercial based weight reduction program, management of depression/anxiety/stress to control compensatory eating, decreasing alcohol consumption, an duran-based approach such as [employer] Pal or Lose It, and Information on healthy weight added to patient's after visit summary.       Follow Up:   Return in about 1 year (around 9/5/2025) for Annual physical.    Healthcare Maintenance:   Counseling provided on exercise, nutrition, age-appropriate screening test, and appropriate lab studies.   Chai Borrero voices understanding and acceptance of this advice and will call back with any further questions or concerns.  AVS with preventive healthcare tips printed for patient.     Praveen Garay MD   [facility] [place] [facility]

## 2025-05-25 ENCOUNTER — HOSPITAL ENCOUNTER (EMERGENCY)
Facility: HOSPITAL | Age: 30
Discharge: HOME | End: 2025-05-25
Attending: EMERGENCY MEDICINE | Admitting: EMERGENCY MEDICINE
Payer: MEDICAID

## 2025-05-25 VITALS
HEART RATE: 88 BPM | HEIGHT: 71 IN | DIASTOLIC BLOOD PRESSURE: 95 MMHG | TEMPERATURE: 98.1 F | RESPIRATION RATE: 18 BRPM | BODY MASS INDEX: 41.3 KG/M2 | OXYGEN SATURATION: 97 % | SYSTOLIC BLOOD PRESSURE: 152 MMHG | WEIGHT: 295 LBS

## 2025-05-25 DIAGNOSIS — R21 RASH: Primary | ICD-10-CM

## 2025-05-25 LAB
BASOPHILS # BLD AUTO: 0.06 10*3/MM3 (ref 0–0.2)
BASOPHILS NFR BLD AUTO: 0.7 % (ref 0–1.5)
DEPRECATED RDW RBC AUTO: 40.9 FL (ref 37–54)
EOSINOPHIL # BLD AUTO: 0.39 10*3/MM3 (ref 0–0.4)
EOSINOPHIL NFR BLD AUTO: 4.6 % (ref 0.3–6.2)
ERYTHROCYTE [DISTWIDTH] IN BLOOD BY AUTOMATED COUNT: 12.7 % (ref 12.3–15.4)
HCT VFR BLD AUTO: 42.1 % (ref 37.5–51)
HGB BLD-MCNC: 14.8 G/DL (ref 13–17.7)
IMM GRANULOCYTES # BLD AUTO: 0.03 10*3/MM3 (ref 0–0.05)
IMM GRANULOCYTES NFR BLD AUTO: 0.4 % (ref 0–0.5)
LYMPHOCYTES # BLD AUTO: 2.56 10*3/MM3 (ref 0.7–3.1)
LYMPHOCYTES NFR BLD AUTO: 30.3 % (ref 19.6–45.3)
MCH RBC QN AUTO: 30.8 PG (ref 26.6–33)
MCHC RBC AUTO-ENTMCNC: 35.2 G/DL (ref 31.5–35.7)
MCV RBC AUTO: 87.7 FL (ref 79–97)
MONOCYTES # BLD AUTO: 0.86 10*3/MM3 (ref 0.1–0.9)
MONOCYTES NFR BLD AUTO: 10.2 % (ref 5–12)
NEUTROPHILS NFR BLD AUTO: 4.55 10*3/MM3 (ref 1.7–7)
NEUTROPHILS NFR BLD AUTO: 53.8 % (ref 42.7–76)
NRBC BLD AUTO-RTO: 0 /100 WBC (ref 0–0.2)
PLATELET # BLD AUTO: 236 10*3/MM3 (ref 140–450)
PMV BLD AUTO: 8.7 FL (ref 6–12)
RBC # BLD AUTO: 4.8 10*6/MM3 (ref 4.14–5.8)
WBC NRBC COR # BLD AUTO: 8.45 10*3/MM3 (ref 3.4–10.8)

## 2025-05-25 PROCEDURE — 85025 COMPLETE CBC W/AUTO DIFF WBC: CPT | Performed by: EMERGENCY MEDICINE

## 2025-05-25 PROCEDURE — 99283 EMERGENCY DEPT VISIT LOW MDM: CPT | Performed by: EMERGENCY MEDICINE

## 2025-05-25 PROCEDURE — 63710000001 PREDNISONE PER 1 MG: Performed by: EMERGENCY MEDICINE

## 2025-05-25 RX ORDER — PREDNISONE 20 MG/1
40 TABLET ORAL ONCE
Status: COMPLETED | OUTPATIENT
Start: 2025-05-25 | End: 2025-05-25

## 2025-05-25 RX ORDER — PREDNISONE 20 MG/1
20 TABLET ORAL DAILY
Qty: 4 TABLET | Refills: 0 | Status: SHIPPED | OUTPATIENT
Start: 2025-05-25

## 2025-05-25 RX ADMIN — PREDNISONE 40 MG: 20 TABLET ORAL at 06:01

## 2025-05-25 NOTE — DISCHARGE INSTRUCTIONS
If you notice any concerning symptoms, please return to the ER immediately. These can include but are not limited to: worsening of you condition, fevers, chills, shortness of breath, vomiting, weakness of the extremities, changes in your mental status, numbness, pale extremities, or chest pain.     Take medications as prescribed, your pharmacist may have additional recommendations concerning these medications.    For pain use ibuprofen (Motrin) or acetaminophen (Tylenol), unless prescribed medications that also contain these medications.  You can take over the counter acetaminophen or ibuprofen, please follow the directions as dosages differ. Do not take ibuprofen if you have a history of peptic ulcers, kidney disease, bariatric surgery, or are currently pregnant.  Do not take Tylenol if you have a history of liver disease or alcohol use disorder.        THANK YOU!!! From [facility] Emergency Department    On behalf of the Emergency Department staff at [facility], I would like to thank you for giving us the opportunity to address your health care needs and concerns. We hope that during your visit, our service was delivered in a professional and caring manner. Please keep [facility] in mind as we walk with you down the path to your own personal wellness. Please expect follow-up phone calls concerning additional care and questions about your experience.      You have received additional information specific to your diagnosis in these discharge instructions, please read these fully.  Anytime you have been seen in the emergency department we recommend close follow up with your primary care provider or specialist, please follow these directions as indicated.

## 2025-05-26 NOTE — ED PROVIDER NOTES
[facility] EMERGENCY DEPARTMENT  Emergency Department Encounter  Emergency Medicine Physician Note     Pt Name:Chai Borrero  MRN: [id number]  Birthdate 1995  Date of evaluation: 5/25/2025  PCP:  Praveen Garay MD  Note Started: 9:09 AM EDT      CHIEF COMPLAINT       Chief Complaint   Patient presents with    Rash       HISTORY OF PRESENT ILLNESS  (Location/Symptom, Timing/Onset, Context/Setting, Quality, Duration, Modifying Factors, Severity.)      Chai Borrero is a 30 y.o. male who presents with rash.  Patient states that he has had a rash has been progressively worsening mammographically.  Patient states that his son also has a rash and recently had a viral illness.  Patient describes a rash on his arms and on his abdomen.  Describes it as itching.  Denies any fevers chills nausea or vomiting or illness.    PAST MEDICAL / SURGICAL / SOCIAL / FAMILY HISTORY     History reviewed. No pertinent past medical history.  No additional pertinent       History reviewed. No pertinent surgical history.  No additional pertinent       Social History     Socioeconomic History    Marital status: Legally    Tobacco Use    Smoking status: Never     Passive exposure: Never    Smokeless tobacco: Never   Vaping Use    Vaping status: Never Used   Substance and Sexual Activity    Alcohol use: Yes     Comment: Social    Drug use: Not Currently       History reviewed. No pertinent family history.    Allergies:  Patient has no known allergies.    Home Medications:  Prior to Admission medications    Medication Sig Start Date End Date Taking? Authorizing Provider   loperamide (Imodium A-D) 2 MG tablet Take 1 tablet by mouth 4 (Four) Times a Day As Needed for Diarrhea. 8/21/24   Praveen Garay MD   predniSONE (DELTASONE) 20 MG tablet Take 1 tablet by mouth Daily. 5/25/25   Lee Solano,    sertraline (ZOLOFT) 100 MG tablet Take 2 tablets by mouth Daily. 9/5/24   Praveen Garay MD   traZODone  "(DESYREL) 50 MG tablet Take 1 tablet by mouth Every Night. 8/21/24   Provider, Chasity, MD         REVIEW OF SYSTEMS       Review of Systems   Constitutional:  Negative for chills and fever.   Skin:  Positive for rash.       PHYSICAL EXAM      INITIAL VITALS:   /95 (BP Location: Left arm, Patient Position: Lying)   Pulse 88   Temp 98.1 °F (36.7 °C) (Oral)   Resp 18   Ht 180.3 cm (71\")   Wt 134 kg (295 lb)   SpO2 97%   BMI 41.14 kg/m²     Physical Exam  Constitutional:       Appearance: Normal appearance.   HENT:      Head: Normocephalic and atraumatic.   Skin:     General: Skin is warm and dry.      Findings: Rash (Patient with 2 different types of rash, has a rash on his abdomen and elsewhere on his arms that is blanchable and appears to be allergic style rash.  In the left AC joint patient does have slight petechial like rash that is nonblanchable) present.   Neurological:      General: No focal deficit present.      Mental Status: He is alert and oriented to person, place, and time.   Psychiatric:         Mood and Affect: Mood normal.         Behavior: Behavior normal.           DDX/DIAGNOSTIC RESULTS / EMERGENCY DEPARTMENT COURSE / MDM     Differential Diagnosis included but not limited: Viral exanthem, allergic reaction, petechial rash    Diagnoses Considered but Do Not Suspect: Low concern for vasculitis at this time.    Decision Rules/Scores utilized: N/A     Tests considered but not ordered and why:  N/A     MIPS: N/A     Code Status Discussion:  Not Discussed    Additional Patient Education Provided: None     Medical Decision Making    Medical Decision Making  Patient presenting with nonspecific rash.  Patient's son also has a rash at this time.  Do feel this may be a viral or other cause of rash.  Patient does have slight petechial rash in the left AC joint, it is nonblanchable.  CBC demonstrates no concerns for thrombocytopenia or lab abnormalities.  Patient afebrile with stable vitals.  " "Patient given prednisone at this time to see if this improves his rash and recommend close follow-up with PCP.  If rash is not improved patient may need biopsy and further evaluation for vasculitis or other complicated causes of rash but do feel at this time trial with prednisone is appropriate.  Patient discharged home in stable condition for further follow-up by PCP.  Patient agreeable with the plan.    Problems Addressed:  Rash: complicated acute illness or injury    Amount and/or Complexity of Data Reviewed  External Data Reviewed: notes.  Labs:  Decision-making details documented in ED Course.    Risk  Prescription drug management.        See ED COURSE for additional MDM statements    EKG  None Performed     All EKG's are interpreted by the Emergency Department Physician who either signs or Co-signs this chart in the absence of a cardiologist.    Additional Scores                   EMERGENCY DEPARTMENT COURSE:    ED Course as of 05/26/25 0909   Sun May 25, 2025   0615 Platelets: 236 [CR]      ED Course User Index  [CR] Lee Solano,        PROCEDURES:  None Performed   Procedures    DATA FOR LAB AND RADIOLOGY TESTS ORDERED BELOW ARE REVIEWED BY THE ED CLINICIAN:    RADIOLOGY: All x-rays, CT, MRI, and formal ultrasound images (except ED bedside ultrasound) are read by the radiologist, see reports below, unless otherwise noted in MDM or here.  Reports below are reviewed by myself.  No orders to display       LABS: Lab orders shown below, the results are reviewed by myself, and all abnormals are listed below.  Labs Reviewed   CBC WITH AUTO DIFFERENTIAL - Normal       Vitals Reviewed:    Vitals:    05/25/25 0512   BP: 152/95   BP Location: Left arm   Patient Position: Lying   Pulse: 88   Resp: 18   Temp: 98.1 °F (36.7 °C)   TempSrc: Oral   SpO2: 97%   Weight: 134 kg (295 lb)   Height: 180.3 cm (71\")       MEDICATIONS GIVEN TO PATIENT THIS ENCOUNTER:  Medications   predniSONE (DELTASONE) tablet 40 mg " (40 mg Oral Given 5/25/25 0601)       CONSULTS:  None    CRITICAL CARE:  There was significant risk of life threatening deterioration of patient's condition requiring my direct management. Critical care time 0 minutes, excluding any documented procedures.    FINAL IMPRESSION      1. Rash          DISPOSITION / PLAN     ED Disposition       ED Disposition   Discharge    Condition   Stable    Comment   --               PATIENT REFERRED TO:  Praveen Garay MD  [address] [place] [id number] [phone]    In 1 week        DISCHARGE MEDICATIONS:     Medication List        START taking these medications      predniSONE 20 MG tablet  Commonly known as: DELTASONE  Take 1 tablet by mouth Daily.            CONTINUE taking these medications      loperamide 2 MG tablet  Commonly known as: Imodium A-D  Take 1 tablet by mouth 4 (Four) Times a Day As Needed for Diarrhea.     sertraline 100 MG tablet  Commonly known as: ZOLOFT  Take 2 tablets by mouth Daily.     traZODone 50 MG tablet  Commonly known as: DESYREL               Where to Get Your Medications        These medications were sent to [address] - [address] [place] [address] [phone] [place] [phone]   [address] [facility] [address]      Phone: [phone]   predniSONE 20 MG tablet         Electronically signed by Lee Solano DO, 05/26/25, 9:09 AM EDT.    Emergency Medicine Physician  [facility]  (Please note that portions of thisnote were completed with a voice recognition program.  Efforts were made to edit the dictations but occasionally words are mis-transcribed.)          Lee Solano DO  05/26/25 0912

## 2025-06-27 DIAGNOSIS — F32.9 MAJOR DEPRESSIVE DISORDER, REMISSION STATUS UNSPECIFIED, UNSPECIFIED WHETHER RECURRENT: ICD-10-CM

## 2025-06-27 RX ORDER — SERTRALINE HYDROCHLORIDE 100 MG/1
200 TABLET, FILM COATED ORAL DAILY
Qty: 180 TABLET | Refills: 1 | Status: SHIPPED | OUTPATIENT
Start: 2025-06-27

## 2025-06-27 NOTE — TELEPHONE ENCOUNTER
Rx Refill Note  Requested Prescriptions     Signed Prescriptions Disp Refills    sertraline (ZOLOFT) 100 MG tablet 180 tablet 1     Sig: Take 2 tablets by mouth Daily.     Authorizing Provider: DONITA SMALLS     Ordering User: KEYSHA DUBON      Last office visit with prescribing clinician: 9/5/2024   Last telemedicine visit with prescribing clinician: Visit date not found   Next office visit with prescribing clinician: Visit date not found                         Would you like a call back once the refill request has been completed: [] Yes [] No    If the office needs to give you a call back, can they leave a voicemail: [] Yes [] No    Keysha Gaitan [facility]  06/27/25, 14:53 EDT

## 2025-06-27 NOTE — TELEPHONE ENCOUNTER
Caller: Chai Borrero    Relationship: Self    Best call back number:   Telephone Information:   Mobile [phone]        Requested Prescriptions:   Requested Prescriptions     Pending Prescriptions Disp Refills    sertraline (ZOLOFT) 100 MG tablet 180 tablet 3     Sig: Take 2 tablets by mouth Daily.        Pharmacy where request should be sent: [facility] [address] [place] [address] [phone] [facility] [phone]     Last office visit with prescribing clinician: 9/5/2024   Last telemedicine visit with prescribing clinician: Visit date not found   Next office visit with prescribing clinician: Visit date not found     Additional details provided by patient:     Does the patient have less than a 3 day supply:  [x] Yes  [] No    Would you like a call back once the refill request has been completed: [] Yes [x] No    If the office needs to give you a call back, can they leave a voicemail: [] Yes [x] No    Willow Garcia Rep   06/27/25 14:38 EDT

## 2025-07-02 ENCOUNTER — OFFICE VISIT (OUTPATIENT)
Dept: FAMILY MEDICINE CLINIC | Facility: CLINIC | Age: 30
End: 2025-07-02

## 2025-07-02 VITALS
DIASTOLIC BLOOD PRESSURE: 84 MMHG | BODY MASS INDEX: 43.9 KG/M2 | TEMPERATURE: 98.6 F | WEIGHT: 313.6 LBS | SYSTOLIC BLOOD PRESSURE: 136 MMHG | HEIGHT: 71 IN | RESPIRATION RATE: 16 BRPM | HEART RATE: 80 BPM | OXYGEN SATURATION: 96 %

## 2025-07-02 DIAGNOSIS — E66.813 CLASS 3 SEVERE OBESITY DUE TO EXCESS CALORIES WITH BODY MASS INDEX (BMI) OF 40.0 TO 44.9 IN ADULT: ICD-10-CM

## 2025-07-02 DIAGNOSIS — R73.03 PREDIABETES: ICD-10-CM

## 2025-07-02 DIAGNOSIS — R74.8 ELEVATED LIVER ENZYMES: ICD-10-CM

## 2025-07-02 DIAGNOSIS — L30.9 DERMATITIS: ICD-10-CM

## 2025-07-02 DIAGNOSIS — E88.810 METABOLIC SYNDROME X: ICD-10-CM

## 2025-07-02 DIAGNOSIS — E78.5 DYSLIPIDEMIA: ICD-10-CM

## 2025-07-02 DIAGNOSIS — L50.9 URTICARIA: Primary | ICD-10-CM

## 2025-07-02 RX ORDER — CHLORHEXIDINE GLUCONATE ORAL RINSE 1.2 MG/ML
15 SOLUTION DENTAL 2 TIMES DAILY
COMMUNITY
Start: 2025-05-08

## 2025-07-02 RX ORDER — PREDNISONE 10 MG/1
TABLET ORAL
Qty: 15 TABLET | Refills: 0 | Status: SHIPPED | OUTPATIENT
Start: 2025-07-02 | End: 2025-07-07

## 2025-07-02 RX ORDER — PHENTERMINE HYDROCHLORIDE 30 MG/1
30 CAPSULE ORAL EVERY MORNING
Qty: 30 CAPSULE | Refills: 0 | Status: SHIPPED | OUTPATIENT
Start: 2025-07-02

## 2025-07-02 RX ORDER — METHYLPREDNISOLONE ACETATE 80 MG/ML
80 INJECTION, SUSPENSION INTRA-ARTICULAR; INTRALESIONAL; INTRAMUSCULAR; SOFT TISSUE ONCE
Status: COMPLETED | OUTPATIENT
Start: 2025-07-02 | End: 2025-07-02

## 2025-07-02 RX ADMIN — METHYLPREDNISOLONE ACETATE 80 MG: 80 INJECTION, SUSPENSION INTRA-ARTICULAR; INTRALESIONAL; INTRAMUSCULAR; SOFT TISSUE at 12:11

## 2025-07-02 NOTE — PROGRESS NOTES
"    Office Note     Name: Chai Borrero  : 1995   MRN: [id number]     Chief Complaint:  Rash (Pt has had a rash right after son had one about two months ago and was put on steroids and creams and it got better. Last week son got rash again and now pt has it again too. Pt has rash on upper inner arm, mid thighs, and back )    Subjective        History of Present Illness:  Chai Borrero is a 30 y.o. male who presents today for  evaluation of a rash.    He reports the onset of a rash this morning, managed with Benadryl. The rash is on his mid-thighs, head, back, and hip line. His son had a similar rash two months ago, initially diagnosed as allergic but later identified as viral, improved with steroids. He developed a less severe rash four days after his son. His son had another outbreak last week. 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    He seeks advice on weight loss medications. Vyvanse prescribed 2.5 years ago helped control appetite but caused severe migraines at night.    I have reviewed the following portions of the patient's history and these were updated and discussed with the patient as appropriate: past family history, past medical history, past social history, past surgical history, and problem list.     Objective     Vital Signs  /84   Pulse 80   Temp 98.6 °F (37 °C) (Oral)   Resp 16   Ht 180.3 cm (71\")   Wt (!) 142 kg (313 lb 9.6 oz)   SpO2 96%   BMI 43.74 kg/m²   Estimated body mass index is 43.74 kg/m² as calculated from the following:    Height as of this encounter: 180.3 cm (71\").    Weight as of this encounter: 142 kg (313 lb 9.6 oz).    Physical Exam  Vitals reviewed.   Constitutional:       General: He is not in acute distress.     Appearance: Normal appearance. He is obese. He is not ill-appearing.   HENT:      Head: Normocephalic.   Eyes:      Extraocular Movements: Extraocular movements intact. "   Cardiovascular:      Rate and Rhythm: Normal rate.   Pulmonary:      Effort: Pulmonary effort is normal.      Breath sounds: Normal breath sounds.   Skin:     Findings: Rash present.   Neurological:      Mental Status: He is alert and oriented to person, place, and time.   Psychiatric:         Mood and Affect: Mood normal.         Behavior: Behavior normal.            Assessment and Plan     Diagnoses and all orders for this visit:    1. Urticaria (Primary)  -     methylPREDNISolone acetate (DEPO-medrol) injection 80 mg  -     predniSONE (DELTASONE) 10 MG tablet; Take 5 tablets by mouth Daily for 1 day, THEN 4 tablets Daily for 1 day, THEN 3 tablets Daily for 1 day, THEN 2 tablets Daily for 1 day, THEN 1 tablet Daily for 1 day.  Dispense: 15 tablet; Refill: 0    2. Dermatitis  -     methylPREDNISolone acetate (DEPO-medrol) injection 80 mg  -     predniSONE (DELTASONE) 10 MG tablet; Take 5 tablets by mouth Daily for 1 day, THEN 4 tablets Daily for 1 day, THEN 3 tablets Daily for 1 day, THEN 2 tablets Daily for 1 day, THEN 1 tablet Daily for 1 day.  Dispense: 15 tablet; Refill: 0    3. Class 3 severe obesity due to excess calories with body mass index (BMI) of 40.0 to 44.9 in adult  -     phentermine 30 MG capsule; Take 1 capsule by mouth Every Morning.  Dispense: 30 capsule; Refill: 0    4. Dyslipidemia    5. Prediabetes    6. Elevated liver enzymes    7. Metabolic syndrome X         Rash  - Likely allergic reaction  - Benadryl at night, Allegra or Claritin in the morning  - Prednisone taper prescribed, start tomorrow  - Avoid hot showers, limit sun exposure, wear light long-sleeved clothing, and a hat outdoors    Obesity  Prediabetes  Dyslipidemia  Metabolic Syndrome  - BP and HR normal  - Phentermine recommended for weight loss, take in the morning to avoid sleep disturbances  - JHOANA reviewed. CSA today.   - Potential side effects include decreased thirst drive  - Prescription sent to pharmacy    Follow-up  -  Follow-up in 1 month    Discussion Summary:     Discussed plan of care in detail with patient today. Patient was encouraged to keep me informed of any acute changes, lack of improvement, or any new concerning symptoms.  Patient is also aware of reasons to seek emergent care. Patient verbalized understanding and agrees with plan of care.    This visit was billed based on MDM.    Follow Up  Return in about 1 month (around 8/2/2025) for Recheck obesity after starting phentermine.    Patient or patient representative verbalized consent for the use of Ambient Listening during the visit with  Praveen Garay MD for chart documentation. 7/19/2025  15:26 EDT    Please note that portions of this note may have been completed with a voice recognition program.     Praveen Garay MD, MPH  [facility]

## 2025-07-19 PROBLEM — R74.8 ELEVATED LIVER ENZYMES: Status: ACTIVE | Noted: 2025-07-19

## 2025-07-21 ENCOUNTER — HOSPITAL ENCOUNTER (EMERGENCY)
Facility: HOSPITAL | Age: 30
Discharge: HOME | End: 2025-07-21
Attending: STUDENT IN AN ORGANIZED HEALTH CARE EDUCATION/TRAINING PROGRAM | Admitting: STUDENT IN AN ORGANIZED HEALTH CARE EDUCATION/TRAINING PROGRAM
Payer: MEDICAID

## 2025-07-21 VITALS
RESPIRATION RATE: 18 BRPM | BODY MASS INDEX: 42.23 KG/M2 | TEMPERATURE: 98 F | OXYGEN SATURATION: 98 % | WEIGHT: 295 LBS | HEART RATE: 76 BPM | SYSTOLIC BLOOD PRESSURE: 109 MMHG | DIASTOLIC BLOOD PRESSURE: 72 MMHG | HEIGHT: 70 IN

## 2025-07-21 DIAGNOSIS — T67.9XXA HEAT EXPOSURE, INITIAL ENCOUNTER: ICD-10-CM

## 2025-07-21 DIAGNOSIS — R11.2 NAUSEA VOMITING AND DIARRHEA: Primary | ICD-10-CM

## 2025-07-21 DIAGNOSIS — R19.7 NAUSEA VOMITING AND DIARRHEA: Primary | ICD-10-CM

## 2025-07-21 LAB
ALBUMIN SERPL-MCNC: 3.9 G/DL (ref 3.5–5.2)
ALBUMIN/GLOB SERPL: 1.6 G/DL
ALP SERPL-CCNC: 56 U/L (ref 39–117)
ALT SERPL W P-5'-P-CCNC: 44 U/L (ref 1–41)
ANION GAP SERPL CALCULATED.3IONS-SCNC: 13.5 MMOL/L (ref 5–15)
AST SERPL-CCNC: 25 U/L (ref 1–40)
BASOPHILS # BLD AUTO: 0.04 10*3/MM3 (ref 0–0.2)
BASOPHILS NFR BLD AUTO: 0.3 % (ref 0–1.5)
BILIRUB SERPL-MCNC: 0.8 MG/DL (ref 0–1.2)
BUN SERPL-MCNC: 11 MG/DL (ref 6–20)
BUN/CREAT SERPL: 12.6 (ref 7–25)
CALCIUM SPEC-SCNC: 8.7 MG/DL (ref 8.6–10.5)
CHLORIDE SERPL-SCNC: 99 MMOL/L (ref 98–107)
CK SERPL-CCNC: 168 U/L (ref 20–200)
CO2 SERPL-SCNC: 22.5 MMOL/L (ref 22–29)
CREAT SERPL-MCNC: 0.87 MG/DL (ref 0.76–1.27)
DEPRECATED RDW RBC AUTO: 41.1 FL (ref 37–54)
EGFRCR SERPLBLD CKD-EPI 2021: 119 ML/MIN/1.73
EOSINOPHIL # BLD AUTO: 0.06 10*3/MM3 (ref 0–0.4)
EOSINOPHIL NFR BLD AUTO: 0.5 % (ref 0.3–6.2)
ERYTHROCYTE [DISTWIDTH] IN BLOOD BY AUTOMATED COUNT: 12.8 % (ref 12.3–15.4)
GLOBULIN UR ELPH-MCNC: 2.4 GM/DL
GLUCOSE SERPL-MCNC: 100 MG/DL (ref 65–99)
HCT VFR BLD AUTO: 47.1 % (ref 37.5–51)
HGB BLD-MCNC: 16.7 G/DL (ref 13–17.7)
IMM GRANULOCYTES # BLD AUTO: 0.06 10*3/MM3 (ref 0–0.05)
IMM GRANULOCYTES NFR BLD AUTO: 0.5 % (ref 0–0.5)
LYMPHOCYTES # BLD AUTO: 1.33 10*3/MM3 (ref 0.7–3.1)
LYMPHOCYTES NFR BLD AUTO: 10.2 % (ref 19.6–45.3)
MCH RBC QN AUTO: 31.1 PG (ref 26.6–33)
MCHC RBC AUTO-ENTMCNC: 35.5 G/DL (ref 31.5–35.7)
MCV RBC AUTO: 87.7 FL (ref 79–97)
MONOCYTES # BLD AUTO: 0.78 10*3/MM3 (ref 0.1–0.9)
MONOCYTES NFR BLD AUTO: 6 % (ref 5–12)
NEUTROPHILS NFR BLD AUTO: 10.78 10*3/MM3 (ref 1.7–7)
NEUTROPHILS NFR BLD AUTO: 82.5 % (ref 42.7–76)
NRBC BLD AUTO-RTO: 0 /100 WBC (ref 0–0.2)
PLATELET # BLD AUTO: 245 10*3/MM3 (ref 140–450)
PMV BLD AUTO: 8.9 FL (ref 6–12)
POTASSIUM SERPL-SCNC: 4 MMOL/L (ref 3.5–5.2)
PROT SERPL-MCNC: 6.3 G/DL (ref 6–8.5)
RBC # BLD AUTO: 5.37 10*6/MM3 (ref 4.14–5.8)
SODIUM SERPL-SCNC: 135 MMOL/L (ref 136–145)
WBC NRBC COR # BLD AUTO: 13.05 10*3/MM3 (ref 3.4–10.8)

## 2025-07-21 PROCEDURE — 96374 THER/PROPH/DIAG INJ IV PUSH: CPT

## 2025-07-21 PROCEDURE — 25810000003 SODIUM CHLORIDE 0.9 % SOLUTION: Performed by: STUDENT IN AN ORGANIZED HEALTH CARE EDUCATION/TRAINING PROGRAM

## 2025-07-21 PROCEDURE — 99283 EMERGENCY DEPT VISIT LOW MDM: CPT | Performed by: STUDENT IN AN ORGANIZED HEALTH CARE EDUCATION/TRAINING PROGRAM

## 2025-07-21 PROCEDURE — 82550 ASSAY OF CK (CPK): CPT | Performed by: STUDENT IN AN ORGANIZED HEALTH CARE EDUCATION/TRAINING PROGRAM

## 2025-07-21 PROCEDURE — 96375 TX/PRO/DX INJ NEW DRUG ADDON: CPT

## 2025-07-21 PROCEDURE — 25010000002 KETOROLAC TROMETHAMINE PER 15 MG: Performed by: STUDENT IN AN ORGANIZED HEALTH CARE EDUCATION/TRAINING PROGRAM

## 2025-07-21 PROCEDURE — 25010000002 ONDANSETRON PER 1 MG: Performed by: STUDENT IN AN ORGANIZED HEALTH CARE EDUCATION/TRAINING PROGRAM

## 2025-07-21 PROCEDURE — 85025 COMPLETE CBC W/AUTO DIFF WBC: CPT | Performed by: STUDENT IN AN ORGANIZED HEALTH CARE EDUCATION/TRAINING PROGRAM

## 2025-07-21 PROCEDURE — 80053 COMPREHEN METABOLIC PANEL: CPT | Performed by: STUDENT IN AN ORGANIZED HEALTH CARE EDUCATION/TRAINING PROGRAM

## 2025-07-21 PROCEDURE — 96361 HYDRATE IV INFUSION ADD-ON: CPT

## 2025-07-21 RX ORDER — ONDANSETRON 4 MG/1
4 TABLET, FILM COATED ORAL EVERY 8 HOURS PRN
Qty: 12 TABLET | Refills: 0 | Status: SHIPPED | OUTPATIENT
Start: 2025-07-21

## 2025-07-21 RX ORDER — KETOROLAC TROMETHAMINE 30 MG/ML
15 INJECTION, SOLUTION INTRAMUSCULAR; INTRAVENOUS ONCE
Status: COMPLETED | OUTPATIENT
Start: 2025-07-21 | End: 2025-07-21

## 2025-07-21 RX ORDER — ONDANSETRON 2 MG/ML
4 INJECTION INTRAMUSCULAR; INTRAVENOUS ONCE
Status: COMPLETED | OUTPATIENT
Start: 2025-07-21 | End: 2025-07-21

## 2025-07-21 RX ADMIN — SODIUM CHLORIDE 1000 ML: 9 INJECTION, SOLUTION INTRAVENOUS at 15:54

## 2025-07-21 RX ADMIN — ONDANSETRON 4 MG: 2 INJECTION, SOLUTION INTRAMUSCULAR; INTRAVENOUS at 15:54

## 2025-07-21 RX ADMIN — KETOROLAC TROMETHAMINE 15 MG: 30 INJECTION INTRAMUSCULAR; INTRAVENOUS at 15:54

## 2025-07-21 NOTE — Clinical Note
[facility] EMERGENCY DEPARTMENT  [address] [facility] [address]  Phone: [phone]    Chai Borrero was seen and treated in our emergency department on 7/21/2025.  He may return to work on 07/23/2025.         Thank you for choosing [facility].    Mirza Elias,

## 2025-07-21 NOTE — DISCHARGE INSTRUCTIONS
Instructions from Dr. Elias:    It was a privilege being your ER physician today.    Please follow-up in clinic as we discussed.    Please return to the ER if you experience any worsening symptoms or for any other concerns.

## 2025-07-21 NOTE — ED PROVIDER NOTES
[facility]  Emergency Department Encounter  Emergency Medicine Physician Note       Pt Name: Chai Borrero  MRN: [id number]  Pt :   1995  Room Number:  01SF  Date of encounter:  2025  PCP: Praveen Garay MD  ED Physician: Mirza Elias DO    HPI:  Chai Borrero is a 30 y.o. male who presents to the ED for medical evaluation.  Patient reports that he began experiencing diarrhea this morning.  Went to work and then developed nausea and vomiting after heat exposure.  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  Nonbloody in nature.  Sent home from work which prompted ED evaluation.  Reports associated muscle cramps.  No fever, chills, altered mental status, headache, chest pain, shortness of breath, abdominal or back pain, problems urination or bowel movements, leg pain or swelling.    Pertinent medical history: Obesity, depression.    Pertinent surgical history: Denies    Denies tobacco, alcohol, illicit drug use.    PAST MEDICAL HISTORY  Past Medical History:   Diagnosis Date    Depression      Current Outpatient Medications   Medication Instructions    chlorhexidine (PERIDEX) 0.12 % solution 15 mL, 2 Times Daily    loperamide (IMODIUM A-D) 2 mg, Oral, 4 Times Daily PRN    ondansetron (ZOFRAN) 4 mg, Oral, Every 8 Hours PRN    phentermine 30 mg, Oral, Every Morning    sertraline (ZOLOFT) 200 mg, Oral, Daily    traZODone (DESYREL) 50 mg, Nightly      PAST SURGICAL HISTORY  History reviewed. No pertinent surgical history.    FAMILY HISTORY  History reviewed. No pertinent family history.    SOCIAL HISTORY  Social History     Socioeconomic History    Marital status: Legally    Tobacco Use    Smoking status: Never     Passive exposure: Never    Smokeless tobacco: Never   Vaping Use    Vaping status: Never Used   Substance and Sexual Activity    Alcohol use: Yes     Comment: Social    Drug use: Not Currently    Sexual activity: Defer      ALLERGIES  Patient has no known allergies.    REVIEW OF SYSTEMS  All systems reviewed and negative except for those discussed in HPI.     PHYSICAL EXAM  ED Triage Vitals [07/21/25 1256]   Temp Heart Rate Resp BP SpO2   98.2 °F (36.8 °C) 115 18 139/95 98 %      Temp src Heart Rate Source Patient Position BP Location FiO2 (%)   Oral Monitor -- Left arm --     I have reviewed the triage vital signs and nursing notes.    General: Alert.  Nontoxic appearance.  No acute distress.  Head: Normocephalic.  Atraumatic.  Eyes: No scleral icterus.  ENT: Moist mucous membranes.  Cardiovascular: Tachycardic. Regular rhythm.  No murmurs.  No rubs.  2+ distal pulses bilaterally.  Respiratory: No wheezing. No rales.  No rhonchi.  GI: Abdomen is soft.  Nondistended.  Nontender to palpation.  No rebound.  No guarding.  No CVA tenderness.  MSK: Moves all 4 extremities.  Neurologic: Oriented x 3.  No focal deficits.  Skin: No edema. No erythema. No pallor. No cyanosis.  Psych: Normal mood and affect.    LAB RESULTS  Recent Results (from the past 24 hours)   CBC Auto Differential    Collection Time: 07/21/25  3:30 PM    Specimen: Blood   Result Value Ref Range    WBC 13.05 (H) 3.40 - 10.80 10*3/mm3    RBC 5.37 4.14 - 5.80 10*6/mm3    Hemoglobin 16.7 13.0 - 17.7 g/dL    Hematocrit 47.1 37.5 - 51.0 %    MCV 87.7 79.0 - 97.0 fL    MCH 31.1 26.6 - 33.0 pg    MCHC 35.5 31.5 - 35.7 g/dL    RDW 12.8 12.3 - 15.4 %    RDW-SD 41.1 37.0 - 54.0 fl    MPV 8.9 6.0 - 12.0 fL    Platelets 245 140 - 450 10*3/mm3    Neutrophil % 82.5 (H) 42.7 - 76.0 %    Lymphocyte % 10.2 (L) 19.6 - 45.3 %    Monocyte % 6.0 5.0 - 12.0 %    Eosinophil % 0.5 0.3 - 6.2 %    Basophil % 0.3 0.0 - 1.5 %    Immature Grans % 0.5 0.0 - 0.5 %    Neutrophils, Absolute 10.78 (H) 1.70 - 7.00 10*3/mm3    Lymphocytes, Absolute 1.33 0.70 - 3.10 10*3/mm3    Monocytes, Absolute 0.78 0.10 - 0.90 10*3/mm3    Eosinophils, Absolute 0.06 0.00 - 0.40 10*3/mm3    Basophils, Absolute 0.04 0.00  - 0.20 10*3/mm3    Immature Grans, Absolute 0.06 (H) 0.00 - 0.05 10*3/mm3    nRBC 0.0 0.0 - 0.2 /100 WBC   Comprehensive Metabolic Panel    Collection Time: 07/21/25  3:59 PM    Specimen: Blood   Result Value Ref Range    Glucose 100 (H) 65 - 99 mg/dL    BUN 11.0 6.0 - 20.0 mg/dL    Creatinine 0.87 0.76 - 1.27 mg/dL    Sodium 135 (L) 136 - 145 mmol/L    Potassium 4.0 3.5 - 5.2 mmol/L    Chloride 99 98 - 107 mmol/L    CO2 22.5 22.0 - 29.0 mmol/L    Calcium 8.7 8.6 - 10.5 mg/dL    Total Protein 6.3 6.0 - 8.5 g/dL    Albumin 3.9 3.5 - 5.2 g/dL    ALT (SGPT) 44 (H) 1 - 41 U/L    AST (SGOT) 25 1 - 40 U/L    Alkaline Phosphatase 56 39 - 117 U/L    Total Bilirubin 0.8 0.0 - 1.2 mg/dL    Globulin 2.4 gm/dL    A/G Ratio 1.6 g/dL    BUN/Creatinine Ratio 12.6 7.0 - 25.0    Anion Gap 13.5 5.0 - 15.0 mmol/L    eGFR 119.0 >60.0 mL/min/1.73   CK    Collection Time: 07/21/25  3:59 PM    Specimen: Blood   Result Value Ref Range    Creatine Kinase 168 20 - 200 U/L       RADIOLOGY  No Radiology Exams Resulted Within Past 24 Hours    PROCEDURES  Procedures    RISK STRATIFICATION    MEDICAL DECISION MAKING  30 y.o. male with past medical history listed above who presents with nausea, vomiting, diarrhea, heat exposure.    Vital signs remarkable for tachycardia, o/w WNL. Afebrile.     Based on clinical presentation and physical exam, differential diagnosis includes, but is not limited to, heat exposure verses exhaustion, dehydration, metabolic derangement, rhabdomyolysis.  No clinical evidence of heatstroke, infection, intra-abdominal infective/surgical process.    At least 3 different tests have been ordered on this patient.    Medications administered in ED:  Medications   sodium chloride 0.9 % bolus 1,000 mL (1,000 mL Intravenous New Bag 7/21/25 3714)   ketorolac (TORADOL) injection 15 mg (15 mg Intravenous Given 7/21/25 3894)   ondansetron (ZOFRAN) injection 4 mg (4 mg Intravenous Given 7/21/25 1554)     Please see ED course below for  my interpretation of the ED workup.  ED Course as of 07/21/25 1652   Mon Jul 21, 2025   1620 I reviewed the labs listed above. Clinically unremarkable.    Notable findings are highlighted below.    Old laboratory data was reviewed from the medical records and compared to today's results.   [JS]   1621 WBC(!): 13.05 [JS]   1621 ALT (SGPT)(!): 44 [JS]   1621 Creatine Kinase: 168 [JS]   1651 Cardiac telemetry was reviewed and interpreted by me. Reveals per my interpretation normal sinus rhythm at a rate of 92.   [JS]      ED Course User Index  [JS] Mirza Elias DO     On re-evaluation, patient resting comfortably.  States symptoms have improved following therapy. Repeat abdominal exam performed.  Abdomen remains soft, nondistended, nonsurgical in nature. Tachycardia normalized. Vital signs remained stable on room air.  Patient was ambulatory in the ED with steady gait.  Able to tolerate oral intake appropriately.    I discussed the findings of the ED workup with the patient at bedside.  No clinical indication for admission. I recommended outpatient follow-up with PCP.  Patient was deemed medically stable for discharge with close outpatient follow-up and strict ED return precautions. Patient agreeable with plan and disposition.    Home medications were reviewed.  Prescriptions for discharge: Zofran    Chronic conditions affecting care: None    Social determinants of health impacting treatment or disposition: None    REPEAT VITAL SIGNS  AS OF 16:52 EDT VITALS:  BP - 120/75  HR - 93  TEMP - 98 °F (36.7 °C) (Oral)  O2 SATS - 98%    DIAGNOSIS  Final diagnoses:   Nausea vomiting and diarrhea   Heat exposure, initial encounter     DISPOSITION  ED Disposition       ED Disposition   Discharge    Condition   Stable    Comment   --               PATIENT REFERRALS  Praveen Garay MD  [address] [place] [id number] [phone]      PCP. 48 hours.          Please note that portions of this document were completed with  voice recognition software.        Mirza Elias DO  07/21/25 2265